# Patient Record
Sex: MALE | Race: WHITE | NOT HISPANIC OR LATINO | Employment: FULL TIME | URBAN - NONMETROPOLITAN AREA
[De-identification: names, ages, dates, MRNs, and addresses within clinical notes are randomized per-mention and may not be internally consistent; named-entity substitution may affect disease eponyms.]

---

## 2023-01-22 ENCOUNTER — HOSPITAL ENCOUNTER (EMERGENCY)
Facility: HOSPITAL | Age: 28
Discharge: HOME | End: 2023-01-22
Attending: EMERGENCY MEDICINE | Admitting: EMERGENCY MEDICINE
Payer: COMMERCIAL

## 2023-01-22 ENCOUNTER — APPOINTMENT (OUTPATIENT)
Dept: GENERAL RADIOLOGY | Facility: HOSPITAL | Age: 28
End: 2023-01-22
Payer: COMMERCIAL

## 2023-01-22 VITALS
RESPIRATION RATE: 18 BRPM | TEMPERATURE: 98.5 F | SYSTOLIC BLOOD PRESSURE: 134 MMHG | DIASTOLIC BLOOD PRESSURE: 78 MMHG | HEART RATE: 89 BPM | BODY MASS INDEX: 33.86 KG/M2 | OXYGEN SATURATION: 97 % | WEIGHT: 250 LBS | HEIGHT: 72 IN

## 2023-01-22 DIAGNOSIS — R03.0 ELEVATED BLOOD PRESSURE READING: Primary | ICD-10-CM

## 2023-01-22 LAB
ALBUMIN SERPL-MCNC: 4.8 G/DL (ref 3.5–5.2)
ALBUMIN/GLOB SERPL: 1.4 G/DL
ALP SERPL-CCNC: 77 U/L (ref 39–117)
ALT SERPL W P-5'-P-CCNC: 21 U/L (ref 1–41)
ANION GAP SERPL CALCULATED.3IONS-SCNC: 13.3 MMOL/L (ref 5–15)
AST SERPL-CCNC: 24 U/L (ref 1–40)
BASOPHILS # BLD AUTO: 0.04 10*3/MM3 (ref 0–0.2)
BASOPHILS NFR BLD AUTO: 0.3 % (ref 0–1.5)
BILIRUB SERPL-MCNC: 0.6 MG/DL (ref 0–1.2)
BUN SERPL-MCNC: 18 MG/DL (ref 6–20)
BUN/CREAT SERPL: 18.2 (ref 7–25)
CALCIUM SPEC-SCNC: 9.6 MG/DL (ref 8.6–10.5)
CHLORIDE SERPL-SCNC: 100 MMOL/L (ref 98–107)
CO2 SERPL-SCNC: 25.7 MMOL/L (ref 22–29)
CREAT SERPL-MCNC: 0.99 MG/DL (ref 0.76–1.27)
DEPRECATED RDW RBC AUTO: 42.5 FL (ref 37–54)
EGFRCR SERPLBLD CKD-EPI 2021: 107.7 ML/MIN/1.73
EOSINOPHIL # BLD AUTO: 0.1 10*3/MM3 (ref 0–0.4)
EOSINOPHIL NFR BLD AUTO: 0.8 % (ref 0.3–6.2)
ERYTHROCYTE [DISTWIDTH] IN BLOOD BY AUTOMATED COUNT: 13.1 % (ref 12.3–15.4)
GLOBULIN UR ELPH-MCNC: 3.5 GM/DL
GLUCOSE SERPL-MCNC: 93 MG/DL (ref 65–99)
HCT VFR BLD AUTO: 50.6 % (ref 37.5–51)
HGB BLD-MCNC: 17.5 G/DL (ref 13–17.7)
HOLD SPECIMEN: NORMAL
HOLD SPECIMEN: NORMAL
IMM GRANULOCYTES # BLD AUTO: 0.02 10*3/MM3 (ref 0–0.05)
IMM GRANULOCYTES NFR BLD AUTO: 0.2 % (ref 0–0.5)
LYMPHOCYTES # BLD AUTO: 2.97 10*3/MM3 (ref 0.7–3.1)
LYMPHOCYTES NFR BLD AUTO: 23.2 % (ref 19.6–45.3)
MCH RBC QN AUTO: 30.7 PG (ref 26.6–33)
MCHC RBC AUTO-ENTMCNC: 34.6 G/DL (ref 31.5–35.7)
MCV RBC AUTO: 88.8 FL (ref 79–97)
MONOCYTES # BLD AUTO: 1.05 10*3/MM3 (ref 0.1–0.9)
MONOCYTES NFR BLD AUTO: 8.2 % (ref 5–12)
NEUTROPHILS NFR BLD AUTO: 67.3 % (ref 42.7–76)
NEUTROPHILS NFR BLD AUTO: 8.61 10*3/MM3 (ref 1.7–7)
NRBC BLD AUTO-RTO: 0 /100 WBC (ref 0–0.2)
NT-PROBNP SERPL-MCNC: 9.3 PG/ML (ref 0–450)
PLATELET # BLD AUTO: 243 10*3/MM3 (ref 140–450)
PMV BLD AUTO: 12.2 FL (ref 6–12)
POTASSIUM SERPL-SCNC: 3.6 MMOL/L (ref 3.5–5.2)
PROT SERPL-MCNC: 8.3 G/DL (ref 6–8.5)
RBC # BLD AUTO: 5.7 10*6/MM3 (ref 4.14–5.8)
SODIUM SERPL-SCNC: 139 MMOL/L (ref 136–145)
TROPONIN T SERPL-MCNC: <0.01 NG/ML (ref 0–0.03)
TSH SERPL DL<=0.05 MIU/L-ACNC: 3.37 UIU/ML (ref 0.27–4.2)
WBC NRBC COR # BLD: 12.79 10*3/MM3 (ref 3.4–10.8)
WHOLE BLOOD HOLD COAG: NORMAL
WHOLE BLOOD HOLD SPECIMEN: NORMAL

## 2023-01-22 PROCEDURE — 84484 ASSAY OF TROPONIN QUANT: CPT | Performed by: EMERGENCY MEDICINE

## 2023-01-22 PROCEDURE — 80050 GENERAL HEALTH PANEL: CPT | Performed by: EMERGENCY MEDICINE

## 2023-01-22 PROCEDURE — 99283 EMERGENCY DEPT VISIT LOW MDM: CPT

## 2023-01-22 PROCEDURE — 93005 ELECTROCARDIOGRAM TRACING: CPT | Performed by: EMERGENCY MEDICINE

## 2023-01-22 PROCEDURE — 83880 ASSAY OF NATRIURETIC PEPTIDE: CPT | Performed by: EMERGENCY MEDICINE

## 2023-01-22 PROCEDURE — 71046 X-RAY EXAM CHEST 2 VIEWS: CPT

## 2023-01-22 RX ORDER — SODIUM CHLORIDE 0.9 % (FLUSH) 0.9 %
10 SYRINGE (ML) INJECTION AS NEEDED
Status: DISCONTINUED | OUTPATIENT
Start: 2023-01-22 | End: 2023-01-22 | Stop reason: HOSPADM

## 2023-01-22 RX ORDER — BACLOFEN 20 MG/1
50 TABLET ORAL 3 TIMES DAILY
COMMUNITY

## 2023-01-22 RX ORDER — TESTOSTERONE CYPIONATE 200 MG/ML
200 VIAL (ML) INTRAMUSCULAR WEEKLY
COMMUNITY

## 2023-01-22 RX ORDER — DIVALPROEX SODIUM 500 MG/1
750 TABLET, DELAYED RELEASE ORAL NIGHTLY
COMMUNITY

## 2023-01-22 RX ADMIN — METOPROLOL TARTRATE 25 MG: 25 TABLET, FILM COATED ORAL at 01:24

## 2025-01-08 ENCOUNTER — TELEPHONE (OUTPATIENT)
Dept: UROLOGY | Facility: CLINIC | Age: 30
End: 2025-01-08
Payer: COMMERCIAL

## 2025-01-08 NOTE — TELEPHONE ENCOUNTER
I called and spoke with patient and rescheduled his appt from January 20th to January 16th due to Dr Almaguer being out of the office.    Isabella, CMA

## 2025-01-16 ENCOUNTER — LAB (OUTPATIENT)
Dept: LAB | Facility: HOSPITAL | Age: 30
End: 2025-01-16
Payer: COMMERCIAL

## 2025-01-16 ENCOUNTER — OFFICE VISIT (OUTPATIENT)
Dept: UROLOGY | Facility: CLINIC | Age: 30
End: 2025-01-16
Payer: COMMERCIAL

## 2025-01-16 VITALS
HEIGHT: 72 IN | BODY MASS INDEX: 34.54 KG/M2 | HEART RATE: 86 BPM | OXYGEN SATURATION: 97 % | WEIGHT: 255 LBS | TEMPERATURE: 98 F | SYSTOLIC BLOOD PRESSURE: 130 MMHG | DIASTOLIC BLOOD PRESSURE: 80 MMHG

## 2025-01-16 DIAGNOSIS — E29.1 HYPOGONADISM IN MALE: ICD-10-CM

## 2025-01-16 DIAGNOSIS — N52.9 ERECTILE DYSFUNCTION, UNSPECIFIED ERECTILE DYSFUNCTION TYPE: Primary | ICD-10-CM

## 2025-01-16 DIAGNOSIS — N50.89 SCROTAL MASS: ICD-10-CM

## 2025-01-16 DIAGNOSIS — N52.9 ERECTILE DYSFUNCTION, UNSPECIFIED ERECTILE DYSFUNCTION TYPE: ICD-10-CM

## 2025-01-16 LAB
HCT VFR BLD AUTO: 43.9 % (ref 37.5–51)
HGB BLD-MCNC: 15.6 G/DL (ref 13–17.7)

## 2025-01-16 PROCEDURE — 83002 ASSAY OF GONADOTROPIN (LH): CPT

## 2025-01-16 PROCEDURE — 82670 ASSAY OF TOTAL ESTRADIOL: CPT

## 2025-01-16 PROCEDURE — 36415 COLL VENOUS BLD VENIPUNCTURE: CPT

## 2025-01-16 PROCEDURE — 85014 HEMATOCRIT: CPT

## 2025-01-16 PROCEDURE — 84403 ASSAY OF TOTAL TESTOSTERONE: CPT

## 2025-01-16 PROCEDURE — 84402 ASSAY OF FREE TESTOSTERONE: CPT

## 2025-01-16 PROCEDURE — 85018 HEMOGLOBIN: CPT

## 2025-01-16 RX ORDER — TADALAFIL 5 MG/1
5 TABLET ORAL DAILY PRN
Qty: 90 TABLET | Refills: 4 | Status: SHIPPED | OUTPATIENT
Start: 2025-01-16

## 2025-01-16 RX ORDER — DIVALPROEX SODIUM 250 MG/1
250 TABLET, DELAYED RELEASE ORAL 3 TIMES DAILY
COMMUNITY

## 2025-01-16 RX ORDER — DANTROLENE SODIUM 25 MG/1
25 CAPSULE ORAL 3 TIMES DAILY
COMMUNITY

## 2025-01-16 NOTE — PROGRESS NOTES
Chief Complaint  Chief Complaint   Patient presents with    disorder of male genital organs        Referring Provider:  Ronny Buenrostro,     HPI  Mr. Willis is a 29 y.o. male with below past medical history who presents with ED, hypogonadism.    Past Medical History  Past Medical History:   Diagnosis Date    CVA (cerebral vascular accident) 1996    Erectile dysfunction     Hypertension     Paralysis     Right       Past Surgical History  Past Surgical History:   Procedure Laterality Date    HERNIA REPAIR      TONSILLECTOMY         Medications    Current Outpatient Medications:     baclofen (LIORESAL) 20 MG tablet, Take 2.5 tablets by mouth 3 (Three) Times a Day., Disp: , Rfl:     dantrolene (DANTRIUM) 25 MG capsule, Take 1 capsule by mouth 3 (Three) Times a Day., Disp: , Rfl:     divalproex (DEPAKOTE) 250 MG DR tablet, Take 1 tablet by mouth 3 (Three) Times a Day., Disp: , Rfl:     divalproex (DEPAKOTE) 500 MG DR tablet, Take 750 mg by mouth Every Night., Disp: , Rfl:     metoprolol tartrate (LOPRESSOR) 25 MG tablet, Take 1 tablet by mouth 2 (Two) Times a Day. (Patient not taking: Reported on 1/16/2025), Disp: 60 tablet, Rfl: 1    Testosterone Cypionate 200 MG/ML solution, Inject 200 mg as directed 1 (One) Time Per Week. (Patient not taking: Reported on 1/16/2025), Disp: , Rfl:     Allergies  No Known Allergies    Social History  Social History     Socioeconomic History    Marital status:    Tobacco Use    Smoking status: Never     Passive exposure: Never    Smokeless tobacco: Current     Types: Snuff   Vaping Use    Vaping status: Never Used   Substance and Sexual Activity    Alcohol use: Yes     Alcohol/week: 2.0 standard drinks of alcohol     Types: 2 Cans of beer per week     Comment: occassionally socially    Drug use: Never    Sexual activity: Yes     Partners: Female     Birth control/protection: Condom       Family History  History reviewed. No pertinent family history.    Physical Exam  Visit  "Vitals  /80   Pulse 86   Temp 98 °F (36.7 °C)   Ht 182.9 cm (72\")   Wt 116 kg (255 lb)   SpO2 97%   BMI 34.58 kg/m²     Physical exam was performed and was notable for normal habitus. Spastic right side    Genitourinary  Penis: circumcised penis, glans normal, no penile discharge.  No rashes/lesions.    Testes: descended bilaterally, no masses, nontender to palpation. Remainder of scrotal contents normal. No hernia appreciated.      Labs  No results found for: \"PSA\"    Lab Results   Component Value Date    GLUCOSE 93 01/22/2023    CALCIUM 9.6 01/22/2023     01/22/2023    K 3.6 01/22/2023    CO2 25.7 01/22/2023     01/22/2023    BUN 18 01/22/2023    CREATININE 0.99 01/22/2023    EGFRIFAFRI 106 08/01/2024    BCR 18.2 01/22/2023    ANIONGAP 13.3 01/22/2023       Lab Results   Component Value Date    WBC 12.79 (H) 01/22/2023    HGB 17.5 01/22/2023    HCT 50.6 01/22/2023    MCV 88.8 01/22/2023     01/22/2023       Brief Urine Lab Results       None           Component  Ref Range & Units 2 yr ago   Testosterone, Free by Mass Spec  9.3 - 26.5 pg/mL 6.9 Low    Comment: Performed at:  [facility]  [id number]  : Ryan Freed PhD, Phone:  [phone]  Performed at:  [facility]  [id number]  : Francois Conley MD, Phone:  [phone]   Testosterone Total, LC-MS/MS, Males  264 - 916 ng/dL 3       Radiologic Studies  TESTICULAR ULTRASOUND   HISTORY: Testicular pain. Evaluate for torsion.   PROCEDURE: Ultrasound images of the testicles were obtained bilaterally.   Color Doppler images were obtained.   FINDINGS: The testicles are normal in size and echo texture bilaterally.   Arterial flow is identified bilaterally. No intratesticular masses are   identified. There are small bilateral hydroceles. There is increased   vascularity to the left epididymis likely representing left   epididymitis. There is a " questionable echogenic mass adjacent to the   left epididymis.       Assessment  Mr. Willis is a 29 y.o. male who presents with erectile dysfunction and hypogonadism x 2. 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     Has used cialis 20mg PRN in past and TRT w test cypionate injections in past. Erections improved somewhat on TRT. Has been on depakote many years.     Plan  1.  Total and free T w estradiol and HCT today. LH  2.  Switch to daily cialis  3.  FU in 2 weeks. Will likely start clomid at that time.       Bud Almaguer MD

## 2025-01-17 LAB
ESTRADIOL SERPL HS-MCNC: 27.8 PG/ML
LH SERPL-ACNC: 7.19 MIU/ML

## 2025-01-22 LAB
TESTOST FREE SERPL-MCNC: 6 PG/ML (ref 9.3–26.5)
TESTOST SERPL-MCNC: 326 NG/DL (ref 264–916)

## 2025-02-10 ENCOUNTER — OFFICE VISIT (OUTPATIENT)
Dept: UROLOGY | Facility: CLINIC | Age: 30
End: 2025-02-10
Payer: COMMERCIAL

## 2025-02-10 VITALS
OXYGEN SATURATION: 97 % | HEART RATE: 81 BPM | TEMPERATURE: 97 F | HEIGHT: 72 IN | DIASTOLIC BLOOD PRESSURE: 100 MMHG | SYSTOLIC BLOOD PRESSURE: 130 MMHG | BODY MASS INDEX: 35.21 KG/M2 | WEIGHT: 260 LBS

## 2025-02-10 DIAGNOSIS — E29.1 HYPOGONADISM IN MALE: Primary | ICD-10-CM

## 2025-02-10 RX ORDER — CLOMIPHENE CITRATE 50 MG/1
50 TABLET ORAL 3 TIMES WEEKLY
Qty: 30 TABLET | Refills: 11 | Status: SHIPPED | OUTPATIENT
Start: 2025-02-10

## 2025-02-10 NOTE — PROGRESS NOTES
"CC  hypogonadism    HPI  Mr. Willis is a 29 y.o. male with history below in assessment, who presents for follow up.     At this visit continues to be symptomatic    Past Medical History:   Diagnosis Date    CVA (cerebral vascular accident) 1996    Erectile dysfunction     Hypertension     Paralysis     Right       Past Surgical History:   Procedure Laterality Date    HERNIA REPAIR      TONSILLECTOMY           Current Outpatient Medications:     baclofen (LIORESAL) 20 MG tablet, Take 2.5 tablets by mouth 3 (Three) Times a Day., Disp: , Rfl:     dantrolene (DANTRIUM) 25 MG capsule, Take 1 capsule by mouth 3 (Three) Times a Day., Disp: , Rfl:     divalproex (DEPAKOTE) 250 MG DR tablet, Take 1 tablet by mouth 3 (Three) Times a Day., Disp: , Rfl:     divalproex (DEPAKOTE) 500 MG DR tablet, Take 750 mg by mouth Every Night., Disp: , Rfl:     tadalafil (Cialis) 5 MG tablet, Take 1 tablet by mouth Daily As Needed for Erectile Dysfunction., Disp: 90 tablet, Rfl: 4     Physical Exam  There were no vitals taken for this visit.    Labs  Brief Urine Lab Results       None            Lab Results   Component Value Date    GLUCOSE 93 01/22/2023    CALCIUM 9.6 01/22/2023     01/22/2023    K 3.6 01/22/2023    CO2 25.7 01/22/2023     01/22/2023    BUN 18 01/22/2023    CREATININE 0.99 01/22/2023    EGFRIFAFRI 106 08/01/2024    BCR 18.2 01/22/2023    ANIONGAP 13.3 01/22/2023       Lab Results   Component Value Date    WBC 12.79 (H) 01/22/2023    HGB 15.6 01/16/2025    HCT 43.9 01/16/2025    MCV 88.8 01/22/2023     01/22/2023        No results found for: \"PSA\"    Lab Results   Component Value Date    Testosterone, Free 6.0 (L) 01/16/2025    Testosterone, Total 326 01/16/2025    Total Protein 8.3 (H) 07/14/2023      Component  Ref Range & Units 3 wk ago   Estradiol  pg/mL 27.8     LH  mIU/mL 7.19       Radiographic Studies  No Images in the past 120 days found..      I have reviewed above labs and imaging. "     Assessment  29 y.o. male with erectile dysfunction, delayed ejaculation, and hypogonadism x 2 years. 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 REJI uses CPAP half the time.     Has used cialis 20mg PRN in past and TRT w test cypionate injections in past. Erections improved somewhat on TRT. Has been on depakote many years.     Plan  1.   Continue Daily cialis  2.   Start clomid MWF.  We discussed risk, benefits, and alternatives including going back on testosterone cypionate.  Risk discussed included but were not limited to polycythemia, blood clots, leg swelling, chest swelling, acne, gynecomastia, mood changes.

## 2025-06-04 ENCOUNTER — TELEPHONE (OUTPATIENT)
Dept: UROLOGY | Facility: CLINIC | Age: 30
End: 2025-06-04
Payer: COMMERCIAL

## 2025-07-14 ENCOUNTER — LAB (OUTPATIENT)
Dept: LAB | Facility: HOSPITAL | Age: 30
End: 2025-07-14
Payer: COMMERCIAL

## 2025-07-14 PROCEDURE — 85014 HEMATOCRIT: CPT | Performed by: UROLOGY

## 2025-07-14 PROCEDURE — 82670 ASSAY OF TOTAL ESTRADIOL: CPT | Performed by: UROLOGY

## 2025-07-14 PROCEDURE — 85018 HEMOGLOBIN: CPT | Performed by: UROLOGY

## 2025-07-14 PROCEDURE — 84403 ASSAY OF TOTAL TESTOSTERONE: CPT | Performed by: UROLOGY

## 2025-07-14 PROCEDURE — 84402 ASSAY OF FREE TESTOSTERONE: CPT | Performed by: UROLOGY
